# Patient Record
Sex: FEMALE | Race: OTHER | NOT HISPANIC OR LATINO | URBAN - METROPOLITAN AREA
[De-identification: names, ages, dates, MRNs, and addresses within clinical notes are randomized per-mention and may not be internally consistent; named-entity substitution may affect disease eponyms.]

---

## 2019-02-15 ENCOUNTER — EMERGENCY (EMERGENCY)
Facility: HOSPITAL | Age: 49
LOS: 1 days | Discharge: HOME | End: 2019-02-15
Attending: EMERGENCY MEDICINE
Payer: COMMERCIAL

## 2019-02-15 VITALS
OXYGEN SATURATION: 100 % | TEMPERATURE: 98 F | DIASTOLIC BLOOD PRESSURE: 96 MMHG | HEART RATE: 69 BPM | RESPIRATION RATE: 18 BRPM | SYSTOLIC BLOOD PRESSURE: 188 MMHG

## 2019-02-15 VITALS — HEIGHT: 63 IN | WEIGHT: 158.95 LBS

## 2019-02-15 PROCEDURE — 70450 CT HEAD/BRAIN W/O DYE: CPT

## 2019-02-15 PROCEDURE — 99284 EMERGENCY DEPT VISIT MOD MDM: CPT

## 2019-02-15 PROCEDURE — 70450 CT HEAD/BRAIN W/O DYE: CPT | Mod: 26

## 2019-02-15 PROCEDURE — 99284 EMERGENCY DEPT VISIT MOD MDM: CPT | Mod: 25

## 2019-02-15 RX ORDER — CARVEDILOL PHOSPHATE 80 MG/1
1 CAPSULE, EXTENDED RELEASE ORAL
Qty: 30 | Refills: 0 | OUTPATIENT
Start: 2019-02-15 | End: 2019-03-16

## 2019-02-15 RX ORDER — CARVEDILOL PHOSPHATE 80 MG/1
12.5 CAPSULE, EXTENDED RELEASE ORAL EVERY 12 HOURS
Qty: 0 | Refills: 0 | Status: DISCONTINUED | OUTPATIENT
Start: 2019-02-15 | End: 2019-02-20

## 2019-02-15 RX ORDER — AMLODIPINE BESYLATE 2.5 MG/1
10 TABLET ORAL ONCE
Qty: 0 | Refills: 0 | Status: COMPLETED | OUTPATIENT
Start: 2019-02-15 | End: 2019-02-15

## 2019-02-15 RX ORDER — ACETAMINOPHEN 500 MG
975 TABLET ORAL ONCE
Qty: 0 | Refills: 0 | Status: COMPLETED | OUTPATIENT
Start: 2019-02-15 | End: 2019-02-15

## 2019-02-15 RX ORDER — AMLODIPINE BESYLATE 2.5 MG/1
1 TABLET ORAL
Qty: 30 | Refills: 0 | OUTPATIENT
Start: 2019-02-15 | End: 2019-03-16

## 2019-02-15 RX ADMIN — Medication 975 MILLIGRAM(S): at 19:05

## 2019-02-15 RX ADMIN — CARVEDILOL PHOSPHATE 12.5 MILLIGRAM(S): 80 CAPSULE, EXTENDED RELEASE ORAL at 19:05

## 2019-02-15 RX ADMIN — AMLODIPINE BESYLATE 10 MILLIGRAM(S): 2.5 TABLET ORAL at 19:05

## 2019-02-15 NOTE — ED STATDOCS - PROGRESS NOTE DETAILS
I called [facility] in [place] for patient's medicaiton; have no patient of that name at that location or any [facility] systemwide. prescritpions undder a different last name:  amlodipine 10mg daily (last filled in october for 30 days) and carvedilol ER 20mg (last filled in october for 30 days) headache and BOP improved.  advised on proper use of medications. Called [facility] in [place] for patient's medicaiton list; have no patient of that name at that location or any [facility] systemwide. Confirmed information with patient who now reports that prescriptions may be under a different last name.  [facility] reports:  amlodipine 10mg daily (last filled in october for 30 days) and carvedilol ER 20mg (last filled in october for 30 days).

## 2019-02-15 NOTE — ED ADULT NURSE NOTE - OBJECTIVE STATEMENT
Pt c/o h/a and htn.  Pt states she did not take her BP meds today because she ran out, when she was at doc for refill doc told her to come to ED.  No acute s/s of respiratory distress noted or reported at this time, will continue to monitor

## 2019-02-15 NOTE — ED STATDOCS - PMH
No pertinent past medical history <<----- Click to add NO pertinent Past Medical History Hypertension, unspecified type

## 2019-02-15 NOTE — ED STATDOCS - OBJECTIVE STATEMENT
49 y/o F pt with hx of HTN presents to ED c/o gradual onset HA since today. She presented to PMD today due to HA and was found to be HTN and told to present to ED. IS HTN in triage ( 222/102). Has not taken HTN medications since yesterday. Denies CP, SOB. No change in urinary output. Self medicated with Motrin earlier today with mild relief. Is a nonsmoker. NKDA. No further complaints at this time.  PMD: Juve 47 y/o F pt with hx of HTN presents to ED c/o gradual onset HA since today. She presented to PMD today due to HA and was found to be HTN and told to present to ED. IS HTN in triage ( 222/102). Has not taken HTN medications since yesterday; reports running out yesterday. Denies CP, SOB. No change in urinary output. Self medicated with Motrin earlier today with mild relief. Is a nonsmoker. NKDA. No further complaints at this time.  PMD: [facility] 49 y/o F pt with hx of HTN presents to ED c/o gradual onset HA since today. She presented to PMD today due to HA and was found to be HTN and told to present to ED. IS HTN in triage ( 222/102). Has not taken HTN medications since yesterday; reports running out yesterday. Denies CP, SOB, leg edema. Denies change in urinary output or urine color. Self medicated with Motrin earlier today with mild relief. Is a nonsmoker. NKDA. No further complaints at this time.  PMD: Juve

## 2019-02-15 NOTE — ED STATDOCS - CLINICAL SUMMARY MEDICAL DECISION MAKING FREE TEXT BOX
HTN and HA; will check CT scan and reevaluate. chronic HTN, noncompliant with medical therapy and HA; will check CT scan, obtain information on patient medications and reevaluate.

## 2019-02-15 NOTE — ED STATDOCS - PLAN OF CARE
take medications as prescribed.  follow-up with your doctor next week for blood pressure re-evaluation.  Return immediately to the ER for re-evaluation if your symptoms recur or worsening.

## 2019-02-15 NOTE — ED STATDOCS - CARE PLAN
Principal Discharge DX:	Hypertension, unspecified type  Secondary Diagnosis:	Noncompliance with medication regimen  Secondary Diagnosis:	Headache Principal Discharge DX:	Hypertension, unspecified type  Assessment and plan of treatment:	take medications as prescribed.  follow-up with your doctor next week for blood pressure re-evaluation.  Return immediately to the ER for re-evaluation if your symptoms recur or worsening.  Secondary Diagnosis:	Noncompliance with medication regimen  Secondary Diagnosis:	Headache

## 2020-07-05 NOTE — ED ADULT TRIAGE NOTE - BSA (M2)
PHYSICAL EXAM:    CONSTITUTIONAL: NAD, saturating at >90% on RA.   ENMT: EOMI, PERRLA,  neck supple, No JVD  RESPIRATORY: Clear to percussion bilaterally; No rales, rhonchi, wheezing, or rubs  CARDIOVASCULAR: Regular rate and rhythm; No murmurs, rubs, or gallops, negative edema  GASTROINTESTINAL: Soft, Nontender, Nondistended; Bowel sounds present  EXTREMITIES:  2+ Peripheral Pulses, No clubbing, cyanosis, Left knee tenderness to palpation and swelling.   PSYCH: Alert & Oriented X3, denies suicidal or homicidal ideation, denies auditory or visual hallucinations   NEURO: A&O X3, follows commands. Non focal neuro exam.   SKIN: No rashes or lesions 1.75

## 2023-09-28 PROBLEM — I10 ESSENTIAL (PRIMARY) HYPERTENSION: Chronic | Status: ACTIVE | Noted: 2019-02-17

## 2023-10-13 PROBLEM — Z00.00 ENCOUNTER FOR PREVENTIVE HEALTH EXAMINATION: Status: ACTIVE | Noted: 2023-10-13

## 2023-10-20 ENCOUNTER — APPOINTMENT (OUTPATIENT)
Dept: ORTHOPEDIC SURGERY | Facility: CLINIC | Age: 53
End: 2023-10-20

## 2023-11-17 ENCOUNTER — APPOINTMENT (OUTPATIENT)
Dept: ORTHOPEDIC SURGERY | Facility: CLINIC | Age: 53
End: 2023-11-17
Payer: OTHER MISCELLANEOUS

## 2023-11-17 VITALS — WEIGHT: 160 LBS | BODY MASS INDEX: 27.31 KG/M2 | HEIGHT: 64 IN

## 2023-11-17 DIAGNOSIS — Z78.9 OTHER SPECIFIED HEALTH STATUS: ICD-10-CM

## 2023-11-17 DIAGNOSIS — I10 ESSENTIAL (PRIMARY) HYPERTENSION: ICD-10-CM

## 2023-11-17 PROCEDURE — 99204 OFFICE O/P NEW MOD 45 MIN: CPT

## 2023-11-17 RX ORDER — ENALAPRIL MALEATE 5 MG/1
TABLET ORAL
Refills: 0 | Status: ACTIVE | COMMUNITY

## 2023-11-25 ENCOUNTER — OFFICE (OUTPATIENT)
Dept: URBAN - METROPOLITAN AREA CLINIC 54 | Facility: CLINIC | Age: 53
Setting detail: OPHTHALMOLOGY
End: 2023-11-25
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
## 2023-11-25 PROCEDURE — NO SHOW FE NO SHOW FEE: Performed by: OPHTHALMOLOGY

## 2024-02-22 ENCOUNTER — APPOINTMENT (OUTPATIENT)
Dept: PHYSICAL MEDICINE AND REHAB | Facility: CLINIC | Age: 54
End: 2024-02-22
Payer: OTHER MISCELLANEOUS

## 2024-02-22 VITALS
HEART RATE: 80 BPM | DIASTOLIC BLOOD PRESSURE: 80 MMHG | BODY MASS INDEX: 27.31 KG/M2 | WEIGHT: 160 LBS | SYSTOLIC BLOOD PRESSURE: 136 MMHG | HEIGHT: 64 IN

## 2024-02-22 DIAGNOSIS — M51.27 OTHER INTERVERTEBRAL DISC DISPLACEMENT, LUMBOSACRAL REGION: ICD-10-CM

## 2024-02-22 PROCEDURE — 99205 OFFICE O/P NEW HI 60 MIN: CPT

## 2024-02-22 NOTE — HISTORY OF PRESENT ILLNESS
[Has the patient missed work because of the injury/illness?] : The patient has missed work because of the injury/illness. [No] : The patient is currently not working. [FreeTextEntry2] : CNA [FreeTextEntry1] : Patient is a 55 y/o female who was reportedly involved in a work related incident on 9/8/21. 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  Patient then came under the care of Dr. Jenkins (neurosurgeon) and MRI of L/S was obtained. Patient reports undergoing 3 LESI without improvement. She reports receiving approximately 1 year of physical therapy which was beneficial, last PT treatment was approximately a year ago. Patient reports her current medications for her low back pain are Gabapentin and Cyclobenzaprine. Patient was recently evaluated by Dr. Hancock, patient reports she was not a surgical candidate. Additional PT was recommended. Patient currently complains of low back pain including radicular sx involving BLE with greater involvement on the left. Patient states her low back pain is aggravated with any increase of physical activity as well as sitting, standing, and ambulation. Patient referred to my office for evaluation and treatment of acupuncture/TPI therapy for her low back complaints. Patient rates pain 8/10 with rest, and 8/10 with activity.   [FreeTextEntry6] : 9/8/21

## 2024-02-22 NOTE — PHYSICAL EXAM
[Normal] : Alert and in no acute distress [FreeTextEntry1] : EXAMINATION OF LUMBAR SPINE & LOWER EXTREMITIES:  Patient is alert, cooperative, and answering all questions appropriately.  Upon Inspection: No obvious deformity  Reflexes (R) L/E:                   Quadriceps 2+                   Achilles 2+ Reflexes (L) L/E:                    Quadriceps 2+                    Achilles 2+  Sensory L/E: Altered sensation reported throughout LLE with no dermatomal pattern   Good peripheral Pulses Bilateral L/E  No gross atrophy   Testing:                Babinski [ downgoing bilaterally]               Chaddock [ negative bilaterally]               Oppenheim [ negative bilaterally]               Gonda [ negative bilaterally]               Clonus [ negative at ankle bilaterally]               Leseagues (R) [ negative ]               Leseagues (L) [ negative ]               Kemps [ negative ] SI Jt Lig.Challenege Test (R) Positive SI Jt Lig.Challenege Test (L) Positive S.L.R. ( R )  Positive at 50 degrees S.L.R. ( L ) Positive at 40 degrees  Range of motion testing was performed with the use of a goniometer.                             Flexion:    55 degrees (normal - 75-90)                           Extension:  20 degrees  (normal - 30)                           Lateral Bending ( R ): 35 degrees   (normal - 35)                           Lateral Bending ( L ): 30 degrees   (normal - 35)                           Thoracic Rotation ( R ):  35 degrees    (normal - 35)                           Thoracic Rotation ( L ):   35 degrees    (normal - 35)                           Internal Rotation Femur ( R ): WNL                           External Rotation Femur ( R ): WNL                           Internal Rotation Femur ( L ): WNL                           External Rotation Femur ( L ): WNL Normal gait   Vibratory: Intact Proprioception: Intact MMT: Intact Palpation of the Lumbar Spine: Tenderness involving L4-L5, L5-S1 interspace, tenderness involving b/l SI joint, tenderness and spasm b/l lower lumbar paraspinal, trigger points involving b/l gluteal musculature

## 2024-02-22 NOTE — ASSESSMENT
[Indicate if, in your opinion, the incident that the patient described was the competent medical cause of this injury/illness.] : The incident that the patient described was the competent medical cause of this injury/illness: Yes [Indicate if the patient's complaints are consistent with his/her history of the injury/illness.] : Indicate if the patient's complaints are consistent with his/her history of the injury/illness: Yes [Yes] : Yes, it is consistent [FreeTextEntry1] :  Request PT 2x/week for 6 weeks  PT 2x WEEKLY/X6 WEEKS - L/S MH, ES, DTM - L/S & GLUTEAL MUSCULATURE BEAU FLEXION GLUTEAL & PIRIFORMS STRETCHING QUAD HAMSTRING STRETCHING & STRENGTHENING  CORE AND L/E- PRE'S INSTRUCTION IN PROPER BODY MECHANICS AND POSTURING. INSTRUCTION IN HEP.   Request 8 sessions acupuncture L/S. Goals of which are to decrease pain, dissipate muscle spasm, increase ROM and improve level of function.   Home exercise plan reviewed with patient. Stressed the importance for the need for frequent change in position from sit to stand and stand to sit, as well as use of weight shifting techniques to alleviate low back discomfort. Instruction in proper posturing, body mechanics and lifting techniques.   Recheck in 4-6 weeks [FreeTextEntry5] : 100%

## 2024-05-17 ENCOUNTER — APPOINTMENT (OUTPATIENT)
Dept: PHYSICAL MEDICINE AND REHAB | Facility: CLINIC | Age: 54
End: 2024-05-17
Payer: SELF-PAY

## 2024-05-17 PROCEDURE — 97814 ACUP 1/> W/ESTIM EA ADDL 15: CPT | Mod: 59

## 2024-05-17 PROCEDURE — 97813 ACUP 1/> W/ESTIM 1ST 15 MIN: CPT

## 2024-05-17 NOTE — PROCEDURE
[de-identified] : Acupuncture treatment: baldry technique with multiple needle placement to the lumbar paraspinal and gluteal musculature with UV lamp x 45 minutes Paralumbar chain (bladder meridian) with ES x 45 minutes K3-Ht 3 SI 4-BL 60 with ES x 45 minutes BL 10 SP 6 ST 36 LR 3 LI 4 BL 59 Patient tolerated procedure well

## 2024-05-24 ENCOUNTER — APPOINTMENT (OUTPATIENT)
Dept: PHYSICAL MEDICINE AND REHAB | Facility: CLINIC | Age: 54
End: 2024-05-24
Payer: SELF-PAY

## 2024-05-24 DIAGNOSIS — M54.50 LOW BACK PAIN, UNSPECIFIED: ICD-10-CM

## 2024-05-24 DIAGNOSIS — M48.061 SPINAL STENOSIS, LUMBAR REGION WITHOUT NEUROGENIC CLAUDICATION: ICD-10-CM

## 2024-05-24 DIAGNOSIS — G89.29 LOW BACK PAIN, UNSPECIFIED: ICD-10-CM

## 2024-05-24 DIAGNOSIS — M54.16 RADICULOPATHY, LUMBAR REGION: ICD-10-CM

## 2024-05-24 PROCEDURE — 97814 ACUP 1/> W/ESTIM EA ADDL 15: CPT | Mod: 59

## 2024-05-24 PROCEDURE — 97813 ACUP 1/> W/ESTIM 1ST 15 MIN: CPT

## 2024-05-24 NOTE — PROCEDURE
[de-identified] : Acupuncture treatment: baldry technique with multiple needle placement to the lumbar paraspinal and gluteal musculature with UV lamp x 45 minutes Paralumbar chain (bladder meridian) with ES x 45 minutes K3-Ht 3 SI 4-BL 60 with ES x 45 minutes BL 10 SP 6 ST 36 LR 3 LI 4 BL 59 Patient tolerated procedure well

## 2024-05-31 ENCOUNTER — APPOINTMENT (OUTPATIENT)
Dept: PHYSICAL MEDICINE AND REHAB | Facility: CLINIC | Age: 54
End: 2024-05-31

## 2024-06-06 ENCOUNTER — APPOINTMENT (OUTPATIENT)
Dept: PHYSICAL MEDICINE AND REHAB | Facility: CLINIC | Age: 54
End: 2024-06-06

## 2024-06-07 ENCOUNTER — APPOINTMENT (OUTPATIENT)
Dept: PHYSICAL MEDICINE AND REHAB | Facility: CLINIC | Age: 54
End: 2024-06-07
